# Patient Record
Sex: FEMALE | Race: WHITE | NOT HISPANIC OR LATINO | Employment: FULL TIME | URBAN - NONMETROPOLITAN AREA
[De-identification: names, ages, dates, MRNs, and addresses within clinical notes are randomized per-mention and may not be internally consistent; named-entity substitution may affect disease eponyms.]

---

## 2023-03-02 ENCOUNTER — OFFICE VISIT (OUTPATIENT)
Dept: FAMILY MEDICINE | Facility: CLINIC | Age: 63
End: 2023-03-02
Payer: COMMERCIAL

## 2023-03-02 VITALS
OXYGEN SATURATION: 96 % | RESPIRATION RATE: 18 BRPM | DIASTOLIC BLOOD PRESSURE: 72 MMHG | TEMPERATURE: 98 F | HEIGHT: 67 IN | SYSTOLIC BLOOD PRESSURE: 136 MMHG | WEIGHT: 212 LBS | HEART RATE: 97 BPM | BODY MASS INDEX: 33.27 KG/M2

## 2023-03-02 DIAGNOSIS — I10 PRIMARY HYPERTENSION: ICD-10-CM

## 2023-03-02 DIAGNOSIS — K21.9 GASTROESOPHAGEAL REFLUX DISEASE WITHOUT ESOPHAGITIS: ICD-10-CM

## 2023-03-02 DIAGNOSIS — H93.8X3 SENSATION OF PRESSURE IN EAR, BILATERAL: Primary | ICD-10-CM

## 2023-03-02 DIAGNOSIS — H61.21 IMPACTED CERUMEN OF RIGHT EAR: ICD-10-CM

## 2023-03-02 PROCEDURE — 69209 REMOVE IMPACTED EAR WAX UNI: CPT | Mod: RT,,, | Performed by: NURSE PRACTITIONER

## 2023-03-02 PROCEDURE — 69209 PR REMOVAL IMPACTED CERUMEN USING IRRIGATION/LAVAGE, UNILATERAL: ICD-10-PCS | Mod: RT,,, | Performed by: NURSE PRACTITIONER

## 2023-03-02 PROCEDURE — 99202 PR OFFICE/OUTPT VISIT, NEW, LEVL II, 15-29 MIN: ICD-10-PCS | Mod: 25,,, | Performed by: NURSE PRACTITIONER

## 2023-03-02 PROCEDURE — 99202 OFFICE O/P NEW SF 15 MIN: CPT | Mod: 25,,, | Performed by: NURSE PRACTITIONER

## 2023-03-02 RX ORDER — NAPROXEN SODIUM 220 MG/1
1 TABLET, FILM COATED ORAL DAILY
COMMUNITY

## 2023-03-02 RX ORDER — OMEPRAZOLE 20 MG/1
20 CAPSULE, DELAYED RELEASE ORAL DAILY
COMMUNITY
Start: 2022-10-31

## 2023-03-02 RX ORDER — LISINOPRIL AND HYDROCHLOROTHIAZIDE 12.5; 2 MG/1; MG/1
0.5 TABLET ORAL DAILY
COMMUNITY
Start: 2023-01-23

## 2023-03-02 NOTE — PROGRESS NOTES
ARACELIS Dumont        PATIENT NAME: Vickie Vogt  : 1960  DATE: 3/2/23  MRN: [id number]      Billing Provider: ARACELIS Dumont  Level of Service: AK OFFICE/OUTPT VISIT, OLINDA SANTILLAN II, 15-29 MIN  Patient PCP Information       Provider PCP Type    ARACELIS Dumont [facility]            Reason for Visit / Chief Complaint: Otalgia (Normally sees Dr Ying. Reports she is needing ears washed out, feels pressure in her ears.)         History of Present Illness / Problem Focused Workflow     Vickie Vogt presents to the clinic with Otalgia (Normally sees Dr Ying. Reports she is needing ears washed out, feels pressure in her ears.)     Ms. Vogt presents to clinic with complaints of ear pressure, right greater than left.     Otalgia   There is pain in the right ear. This is a recurrent problem. The current episode started in the past 7 days. The problem has been unchanged. There has been no fever. The pain is at a severity of 0/10. The patient is experiencing no pain. Pertinent negatives include no ear discharge, hearing loss or sore throat. The treatment provided no relief. There is no history of a chronic ear infection, hearing loss or a tympanostomy tube.     Review of Systems     Review of Systems   Constitutional:  Negative for chills and fever.   HENT:  Positive for ear pain. Negative for nasal congestion, ear discharge, hearing loss, postnasal drip, sinus pressure/congestion and sore throat.    Respiratory: Negative.     Cardiovascular: Negative.    Gastrointestinal: Negative.    Genitourinary: Negative.    Neurological: Negative.       Medical / Social / Family History     Past Medical History:   Diagnosis Date    GERD (gastroesophageal reflux disease)     Hypertension        Past Surgical History:   Procedure Laterality Date    HYSTERECTOMY         Social History  Ms. Vickie Vogt   reports that she has been smoking cigarettes. She has been smoking an average of 1.5 packs per day. She has been  exposed to tobacco smoke. She has never used smokeless tobacco. She reports current alcohol use. She reports that she does not use drugs.    Family History  Ms.'s Vickie Vogt  Family history is unknown by patient.    Medications and Allergies     Medications  Outpatient Medications Marked as Taking for the 3/2/23 encounter (Office Visit) with ARACELIS Dumont   Medication Sig Dispense Refill    aspirin 81 MG Chew Take 1 tablet by mouth once daily.      lisinopriL-hydrochlorothiazide (PRINZIDE,ZESTORETIC) 20-12.5 mg per tablet Take 0.5 tablets by mouth once daily.      omeprazole (PRILOSEC) 20 MG capsule Take 20 mg by mouth once daily.         Allergies  Review of patient's allergies indicates:  No Known Allergies      Vitals:    03/02/23 1451   BP: 136/72   Pulse: 97   Resp: 18   Temp: 98.3 °F (36.8 °C)     Physical Exam  Vitals and nursing note reviewed.   Constitutional:       Appearance: Normal appearance.   HENT:      Head: Normocephalic.      Right Ear: Tympanic membrane, ear canal and external ear normal.      Left Ear: Tympanic membrane, ear canal and external ear normal.   Cardiovascular:      Rate and Rhythm: Normal rate and regular rhythm.      Pulses: Normal pulses.      Heart sounds: Normal heart sounds.   Pulmonary:      Effort: Pulmonary effort is normal.      Breath sounds: Normal breath sounds.   Abdominal:      Palpations: Abdomen is soft.   Skin:     General: Skin is warm and dry.   Neurological:      Mental Status: She is alert and oriented to person, place, and time.   Psychiatric:         Behavior: Behavior normal.          No results found for: WBC, HGB, HCT, MCV, PLT       No results found for: NA, K, CL, CO2, GLU, BUN, CREATININE, CALCIUM, PROT, ALBUMIN, BILITOT, ALKPHOS, AST, ALT, ANIONGAP, EGFRNORACEVR     No results found for: CHOL  No results found for: HDL  No results found for: LDLCALC  No results found for: TRIG  No results found for: CHOLHDL     No results found for: LABA1C,  HGBA1C     No results found for: TSH, F3ZEVIY, E3TQSYE, THYROIDAB, FREET4     No results found for: PSA, PSATOTAL, PSAFREE, PSAFREEPCT       Health Maintenance Due   Topic Date Due    Pneumococcal Vaccines (Age 0-64) (1 - PCV) Never done    Hemoglobin A1c (Diabetic Prevention Screening)  Never done       Problem List Items Addressed This Visit          Cardiac/Vascular    Primary hypertension       GI    Gastroesophageal reflux disease without esophagitis     Other Visit Diagnoses       Sensation of pressure in ear, bilateral    -  Primary          Right ear flushed today due to cerumen, instructed pt. To use flonase daily.      Health Maintenance Topics with due status: Not Due       Topic Last Completion Date    Lipid Panel 11/08/2019       No future appointments.     There are no Patient Instructions on file for this visit.  No follow-ups on file.       Date of encounter: 3/2/23

## 2024-01-05 ENCOUNTER — TELEPHONE (OUTPATIENT)
Dept: FAMILY MEDICINE | Facility: CLINIC | Age: 64
End: 2024-01-05

## 2024-01-05 NOTE — TELEPHONE ENCOUNTER
----- Message from Fior Gutierrez sent at 1/5/2024  9:44 AM CST -----  Regarding: Nurse Call Back  Pt needs: a nurse call back.        Phone #: [phone]

## 2024-01-05 NOTE — TELEPHONE ENCOUNTER
Pt called stating she had been having a lingering cough, and thought that she possibly hurt a rib while doing so, after speaking with our NP Wendi , we explained we could not give medical advice with out seeing the Pt and asked her if she would like to come in in which she declined so YANN Adame suggested ibuprofen was an over the counter medication that she recommended for pain and inflammation

## 2025-02-13 ENCOUNTER — OFFICE VISIT (OUTPATIENT)
Dept: FAMILY MEDICINE | Facility: CLINIC | Age: 65
End: 2025-02-13
Payer: COMMERCIAL

## 2025-02-13 VITALS
TEMPERATURE: 98 F | SYSTOLIC BLOOD PRESSURE: 137 MMHG | HEART RATE: 87 BPM | OXYGEN SATURATION: 96 % | WEIGHT: 238 LBS | BODY MASS INDEX: 37.35 KG/M2 | DIASTOLIC BLOOD PRESSURE: 80 MMHG | RESPIRATION RATE: 14 BRPM | HEIGHT: 67 IN

## 2025-02-13 DIAGNOSIS — Z87.09 HISTORY OF PLEURAL EFFUSION: ICD-10-CM

## 2025-02-13 DIAGNOSIS — Z91.09 ENVIRONMENTAL ALLERGIES: ICD-10-CM

## 2025-02-13 DIAGNOSIS — R60.0 BILATERAL LOWER EXTREMITY EDEMA: ICD-10-CM

## 2025-02-13 DIAGNOSIS — I10 PRIMARY HYPERTENSION: Primary | ICD-10-CM

## 2025-02-13 DIAGNOSIS — K21.9 GASTROESOPHAGEAL REFLUX DISEASE, UNSPECIFIED WHETHER ESOPHAGITIS PRESENT: ICD-10-CM

## 2025-02-13 DIAGNOSIS — Z98.890 HISTORY OF THORACENTESIS: ICD-10-CM

## 2025-02-13 DIAGNOSIS — E87.6 HYPOKALEMIA: ICD-10-CM

## 2025-02-13 PROCEDURE — 99214 OFFICE O/P EST MOD 30 MIN: CPT | Mod: ,,, | Performed by: NURSE PRACTITIONER

## 2025-02-13 RX ORDER — FUROSEMIDE 20 MG/1
20 TABLET ORAL DAILY
Qty: 90 TABLET | Refills: 1 | Status: SHIPPED | OUTPATIENT
Start: 2025-02-13

## 2025-02-13 RX ORDER — MONTELUKAST SODIUM 10 MG/1
10 TABLET ORAL
COMMUNITY
Start: 2025-02-05 | End: 2025-02-13 | Stop reason: SDUPTHER

## 2025-02-13 RX ORDER — CARVEDILOL 6.25 MG/1
6.25 TABLET ORAL 2 TIMES DAILY
COMMUNITY
End: 2025-02-13 | Stop reason: SDUPTHER

## 2025-02-13 RX ORDER — OMEPRAZOLE 20 MG/1
20 CAPSULE, DELAYED RELEASE ORAL DAILY
Qty: 90 CAPSULE | Refills: 1 | Status: SHIPPED | OUTPATIENT
Start: 2025-02-13

## 2025-02-13 RX ORDER — MULTIVITAMIN
1 TABLET ORAL
COMMUNITY

## 2025-02-13 RX ORDER — LISINOPRIL 10 MG/1
10 TABLET ORAL
COMMUNITY
Start: 2025-01-14 | End: 2025-02-13 | Stop reason: SDUPTHER

## 2025-02-13 RX ORDER — CARVEDILOL 6.25 MG/1
6.25 TABLET ORAL 2 TIMES DAILY
Qty: 180 TABLET | Refills: 1 | Status: SHIPPED | OUTPATIENT
Start: 2025-02-13

## 2025-02-13 RX ORDER — FERROUS SULFATE 325(65) MG
325 TABLET, DELAYED RELEASE (ENTERIC COATED) ORAL
COMMUNITY

## 2025-02-13 RX ORDER — MONTELUKAST SODIUM 10 MG/1
10 TABLET ORAL DAILY
Qty: 90 TABLET | Refills: 0 | Status: SHIPPED | OUTPATIENT
Start: 2025-02-13

## 2025-02-13 RX ORDER — FUROSEMIDE 20 MG/1
20 TABLET ORAL DAILY
COMMUNITY
End: 2025-02-13 | Stop reason: SDUPTHER

## 2025-02-13 RX ORDER — LISINOPRIL 10 MG/1
10 TABLET ORAL DAILY
Qty: 90 TABLET | Refills: 1 | Status: SHIPPED | OUTPATIENT
Start: 2025-02-13

## 2025-02-13 RX ORDER — POTASSIUM CHLORIDE 600 MG/1
8 TABLET, FILM COATED, EXTENDED RELEASE ORAL
COMMUNITY
Start: 2025-01-02 | End: 2025-02-13 | Stop reason: SDUPTHER

## 2025-02-13 RX ORDER — POTASSIUM CHLORIDE 600 MG/1
8 TABLET, FILM COATED, EXTENDED RELEASE ORAL DAILY
Qty: 90 TABLET | Refills: 0 | Status: SHIPPED | OUTPATIENT
Start: 2025-02-13

## 2025-02-13 NOTE — PROGRESS NOTES
[facility] Medicine    Chief Complaint      Chief Complaint   Patient presents with    Establish Care    insurance     Pt has insurance but currently not accepted. Pt requesting care gaps be done when she does have insurance we take       History of Present Illness      Vickie Vogt is a 64 y.o. female. She  has a past medical history of Bilateral edema of lower extremity, GERD (gastroesophageal reflux disease), Hypertension, Pleural effusion, and Prediabetes., who presents today for Establishing Care.    Past Medical History:  Past Medical History:   Diagnosis Date    Bilateral edema of lower extremity     GERD (gastroesophageal reflux disease)     Hypertension     Pleural effusion     right sided pleural effusion with atelectasis    Prediabetes        Past Surgical History:   has a past surgical history that includes Hysterectomy and Drainage of pleural effusion (Right).    Social History:  Social History     Tobacco Use    Smoking status: Former     Current packs/day: 0.00     Average packs/day: 1.5 packs/day for 49.7 years (74.6 ttl pk-yrs)     Types: Cigarettes     Start date: 1975     Quit date: 2024     Years since quittin.2     Passive exposure: Current    Smokeless tobacco: Never   Substance Use Topics    Alcohol use: Yes     Comment: 3-4 beers a day    Drug use: Never       I personally reviewed all past medical, surgical, and social.     Review of Systems   Constitutional:  Negative for fatigue.   HENT:  Negative for ear pain and sore throat.    Eyes:  Negative for pain, discharge and visual disturbance.   Respiratory:  Negative for cough and shortness of breath.    Cardiovascular: Negative.    Gastrointestinal:  Negative for abdominal pain, constipation and diarrhea.   Genitourinary:  Negative for dysuria, menstrual problem and vaginal discharge.   Musculoskeletal:  Negative for gait problem.   Skin: Negative.    Neurological:  Negative for dizziness and light-headedness.         Medications:  Outpatient Encounter Medications as of 2/13/2025   Medication Sig Dispense Refill    aspirin 81 MG Chew Take 1 tablet by mouth once daily.      ferrous sulfate 325 (65 FE) MG EC tablet Take 325 mg by mouth.      multivitamin (THERAGRAN) per tablet Take 1 tablet by mouth.      [DISCONTINUED] carvediloL (COREG) 6.25 MG tablet Take 6.25 mg by mouth 2 (two) times daily.      [DISCONTINUED] furosemide (LASIX) 20 MG tablet Take 20 mg by mouth once daily.      [DISCONTINUED] lisinopriL 10 MG tablet Take 10 mg by mouth.      [DISCONTINUED] montelukast (SINGULAIR) 10 mg tablet Take 10 mg by mouth.      [DISCONTINUED] omeprazole (PRILOSEC) 20 MG capsule Take 20 mg by mouth once daily.      [DISCONTINUED] potassium chloride (KLOR-CON) 8 MEQ TbSR Take 8 mEq by mouth.      carvediloL (COREG) 6.25 MG tablet Take 1 tablet (6.25 mg total) by mouth 2 (two) times daily. 180 tablet 1    furosemide (LASIX) 20 MG tablet Take 1 tablet (20 mg total) by mouth once daily. 90 tablet 1    lisinopriL 10 MG tablet Take 1 tablet (10 mg total) by mouth once daily. 90 tablet 1    montelukast (SINGULAIR) 10 mg tablet Take 1 tablet (10 mg total) by mouth once daily. 90 tablet 0    omeprazole (PRILOSEC) 20 MG capsule Take 1 capsule (20 mg total) by mouth once daily. 90 capsule 1    potassium chloride (KLOR-CON) 8 MEQ TbSR Take 1 tablet (8 mEq total) by mouth once daily. 90 tablet 0    [DISCONTINUED] lisinopriL-hydrochlorothiazide (PRINZIDE,ZESTORETIC) 20-12.5 mg per tablet Take 0.5 tablets by mouth once daily.       No facility-administered encounter medications on file as of 2/13/2025.       Allergies:  Review of patient's allergies indicates:  No Known Allergies    Health Maintenance:    There is no immunization history on file for this patient.   Health Maintenance   Topic Date Due    Hepatitis C Screening  Never done    Lipid Panel  Never done    TETANUS VACCINE  Never done    Mammogram  Never done    Colorectal Cancer Screening  " Never done    LDCT Lung Screen  Never done    Shingles Vaccine (1 of 2) Never done    Pneumococcal Vaccines (Age 50+) (1 of 1 - PCV) Never done    RSV Vaccine (Age 60+ and Pregnant patients) (1 - Risk 60-74 years 1-dose series) Never done    Influenza Vaccine (1) 09/01/2024    COVID-19 Vaccine (1 - 2024-25 season) Never done    HIV Screening  03/02/2029 (Originally 6/14/1975)    Hemoglobin A1c (Diabetic Prevention Screening)  01/14/2028        Physical Exam      Vital Signs  Temp: 97.8 °F (36.6 °C)  Temp Source: Oral  Pulse: 87  Resp: 14  SpO2: 96 %  BP: 137/80  Pain Score: 0-No pain  Height and Weight  Height: 5' 7" (170.2 cm)  Weight: 108 kg (238 lb)  BSA (Calculated - sq m): 2.26 sq meters  BMI (Calculated): 37.3  Weight in (lb) to have BMI = 25: 159.3]    Physical Exam  Vitals and nursing note reviewed.   Constitutional:       Appearance: Normal appearance. She is well-developed.   HENT:      Head: Normocephalic.      Right Ear: Hearing normal.      Left Ear: Hearing normal.      Nose: Nose normal.   Eyes:      General: Lids are normal.      Extraocular Movements: Extraocular movements intact.      Conjunctiva/sclera: Conjunctivae normal.      Pupils: Pupils are equal, round, and reactive to light.   Cardiovascular:      Rate and Rhythm: Normal rate and regular rhythm.      Heart sounds: Normal heart sounds.   Pulmonary:      Effort: Pulmonary effort is normal.      Breath sounds: Normal breath sounds.   Musculoskeletal:         General: Normal range of motion.      Cervical back: Normal range of motion and neck supple.      Right lower leg: No edema.      Left lower leg: No edema.   Skin:     General: Skin is warm and dry.   Neurological:      Mental Status: She is alert and oriented to person, place, and time.      Gait: Gait is intact.   Psychiatric:         Behavior: Behavior is cooperative.          Laboratory:  CBC:      CMP:        Invalid input(s): "CREATININ"  LIPIDS:      TSH:      A1C:  Recent Labs "   Lab 01/14/25  1600   Hemoglobin A1C 6       Assessment/Plan     Vickie Vogt is a 64 y.o.female with:     1. Primary hypertension  -     carvediloL (COREG) 6.25 MG tablet; Take 1 tablet (6.25 mg total) by mouth 2 (two) times daily.  Dispense: 180 tablet; Refill: 1  -     furosemide (LASIX) 20 MG tablet; Take 1 tablet (20 mg total) by mouth once daily.  Dispense: 90 tablet; Refill: 1  -     lisinopriL 10 MG tablet; Take 1 tablet (10 mg total) by mouth once daily.  Dispense: 90 tablet; Refill: 1    2. Environmental allergies  -     montelukast (SINGULAIR) 10 mg tablet; Take 1 tablet (10 mg total) by mouth once daily.  Dispense: 90 tablet; Refill: 0    3. Gastroesophageal reflux disease, unspecified whether esophagitis present  -     omeprazole (PRILOSEC) 20 MG capsule; Take 1 capsule (20 mg total) by mouth once daily.  Dispense: 90 capsule; Refill: 1    4. Hypokalemia  -     potassium chloride (KLOR-CON) 8 MEQ TbSR; Take 1 tablet (8 mEq total) by mouth once daily.  Dispense: 90 tablet; Refill: 0    5. History of pleural effusion    6. History of thoracentesis    7. Bilateral lower extremity edema         Total time spent face-to-face and non-face-to-face coordinating care for this encounter was: 30 minutes     Chronic conditions status updated as per HPI.  Other than changes above, cont current medications and maintain follow up with specialists.  Return to clinic in 6 month(s).    Wendi Padron, ARACELIS  [facility]

## 2025-06-10 ENCOUNTER — APPOINTMENT (OUTPATIENT)
Dept: RADIOLOGY | Facility: CLINIC | Age: 65
End: 2025-06-10
Attending: NURSE PRACTITIONER
Payer: MEDICARE

## 2025-06-10 ENCOUNTER — OFFICE VISIT (OUTPATIENT)
Dept: FAMILY MEDICINE | Facility: CLINIC | Age: 65
End: 2025-06-10
Payer: MEDICARE

## 2025-06-10 VITALS
HEART RATE: 83 BPM | WEIGHT: 235 LBS | RESPIRATION RATE: 15 BRPM | TEMPERATURE: 98 F | BODY MASS INDEX: 36.88 KG/M2 | SYSTOLIC BLOOD PRESSURE: 134 MMHG | HEIGHT: 67 IN | OXYGEN SATURATION: 96 % | DIASTOLIC BLOOD PRESSURE: 70 MMHG

## 2025-06-10 DIAGNOSIS — Z87.09 HISTORY OF PLEURAL EFFUSION: ICD-10-CM

## 2025-06-10 DIAGNOSIS — Z09 FOLLOW-UP EXAM: Primary | ICD-10-CM

## 2025-06-10 PROCEDURE — 71046 X-RAY EXAM CHEST 2 VIEWS: CPT | Mod: 26,,, | Performed by: RADIOLOGY

## 2025-06-10 PROCEDURE — 71046 X-RAY EXAM CHEST 2 VIEWS: CPT | Mod: TC,RHCUB | Performed by: NURSE PRACTITIONER

## 2025-06-10 PROCEDURE — 99212 OFFICE O/P EST SF 10 MIN: CPT | Mod: ,,, | Performed by: NURSE PRACTITIONER

## 2025-06-10 NOTE — PROGRESS NOTES
[facility] Medicine    Chief Complaint      Chief Complaint   Patient presents with    Follow-up     On Dyspnea , Patient reports she is here to have xrays to make sure fluid is not building up on her lungs since  she had it removed in December        History of Present Illness      Vickie Vogt is a 64 y.o. female. She  has a past medical history of Bilateral edema of lower extremity, GERD (gastroesophageal reflux disease), Hypertension, Pleural effusion, and Prediabetes., who presents today for f/u of her SOB with pleural effusion.  She would like a f/u chest xray just to make sure everything is still ok. She denies any symptoms today.     Past Medical History:  Past Medical History:   Diagnosis Date    Bilateral edema of lower extremity     GERD (gastroesophageal reflux disease)     Hypertension     Pleural effusion     right sided pleural effusion with atelectasis    Prediabetes        Past Surgical History:   has a past surgical history that includes Hysterectomy and Drainage of pleural effusion (Right).    Social History:  Social History[1]    I personally reviewed all past medical, surgical, and social.     Review of Systems   Constitutional:  Negative for fatigue.   Respiratory:  Negative for cough and shortness of breath.    Cardiovascular: Negative.    Musculoskeletal:  Negative for gait problem.   Skin: Negative.    Neurological:  Negative for dizziness, light-headedness and headaches.        Medications:  Encounter Medications[2]    Allergies:  Review of patient's allergies indicates:  No Known Allergies    Health Maintenance:  Immunization History   Administered Date(s) Administered    COVID-19, MRNA, LN-S, PF (MODERNA FULL 0.5 ML DOSE) 08/20/2021, 09/17/2021      Health Maintenance   Topic Date Due    Hepatitis C Screening  Never done    TETANUS VACCINE  Never done    Mammogram  Never done    Colorectal Cancer Screening  Never done    Shingles Vaccine (1 of 2) Never done    Pneumococcal Vaccines (Age  "50+) (1 of 1 - PCV) Never done    RSV Vaccine (Age 60+ and Pregnant patients) (1 - Risk 60-74 years 1-dose series) Never done    COVID-19 Vaccine (3 - 2024-25 season) 09/01/2024    HIV Screening  03/02/2029 (Originally 6/14/1975)    Influenza Vaccine (Season Ended) 09/01/2025    LDCT Lung Screen  11/03/2025    Hemoglobin A1c (Diabetic Prevention Screening)  01/14/2028    Lipid Panel  11/04/2029        Physical Exam      Vital Signs  Temp: 98.3 °F (36.8 °C)  Temp Source: Oral  Pulse: 83  Resp: 15  SpO2: 96 %  BP: 134/70  BP Location: Left arm  Patient Position: Sitting  Pain Score: 0-No pain  Height and Weight  Height: 5' 7" (170.2 cm)  Weight: 106.6 kg (235 lb)  BSA (Calculated - sq m): 2.24 sq meters  BMI (Calculated): 36.8  Weight in (lb) to have BMI = 25: 159.3]    Physical Exam  Vitals and nursing note reviewed.   Constitutional:       Appearance: Normal appearance. She is well-developed.   HENT:      Head: Normocephalic.      Right Ear: Hearing normal.      Left Ear: Hearing normal.      Nose: Nose normal.   Eyes:      General: Lids are normal.      Conjunctiva/sclera: Conjunctivae normal.   Cardiovascular:      Rate and Rhythm: Normal rate and regular rhythm.      Heart sounds: Normal heart sounds.   Pulmonary:      Effort: Pulmonary effort is normal.      Breath sounds: Normal breath sounds.   Musculoskeletal:         General: Normal range of motion.      Cervical back: Normal range of motion and neck supple.      Right lower leg: No edema.      Left lower leg: No edema.   Skin:     General: Skin is warm and dry.   Neurological:      Mental Status: She is alert and oriented to person, place, and time.      Gait: Gait is intact.   Psychiatric:         Behavior: Behavior is cooperative.          Laboratory:  CBC:      CMP:        Invalid input(s): "CREATININ"  LIPIDS:      TSH:      A1C:  Recent Labs   Lab 01/14/25  1600   Hemoglobin A1C 6       Assessment/Plan     Vickie Vogt is a 64 y.o.female with:     1. " Follow-up exam  -     X-Ray Chest PA And Lateral; Future; Expected date: 06/10/2025    2. History of pleural effusion  -     X-Ray Chest PA And Lateral; Future; Expected date: 06/10/2025         Total time spent face-to-face and non-face-to-face coordinating care for this encounter was: 15 minutes     Chronic conditions status updated as per HPI.  Other than changes above, cont current medications and maintain follow up with specialists.  Return to clinic prn if symptoms worsen or fail to improve.    Wendi Padron, [facility]         [1]   Social History  Tobacco Use    Smoking status: Former     Current packs/day: 0.00     Average packs/day: 1.5 packs/day for 49.7 years (74.6 ttl pk-yrs)     Types: Cigarettes     Start date: 1975     Quit date: 2024     Years since quittin.5     Passive exposure: Current    Smokeless tobacco: Never   Substance Use Topics    Alcohol use: Yes     Comment: 3-4 beers a day    Drug use: Never   [2]   Outpatient Encounter Medications as of 6/10/2025   Medication Sig Dispense Refill    aspirin 81 MG Chew Take 1 tablet by mouth once daily.      carvediloL (COREG) 6.25 MG tablet Take 1 tablet (6.25 mg total) by mouth 2 (two) times daily. 180 tablet 1    ferrous sulfate 325 (65 FE) MG EC tablet Take 325 mg by mouth.      furosemide (LASIX) 20 MG tablet Take 1 tablet (20 mg total) by mouth once daily. 90 tablet 1    lisinopriL 10 MG tablet Take 1 tablet (10 mg total) by mouth once daily. 90 tablet 1    montelukast (SINGULAIR) 10 mg tablet Take 1 tablet (10 mg total) by mouth once daily. 90 tablet 0    multivitamin (THERAGRAN) per tablet Take 1 tablet by mouth.      omeprazole (PRILOSEC) 20 MG capsule Take 1 capsule (20 mg total) by mouth once daily. 90 capsule 1    potassium chloride (KLOR-CON) 8 MEQ TbSR Take 1 tablet (8 mEq total) by mouth once daily. 90 tablet 0     No facility-administered encounter medications on file as of 6/10/2025.

## 2025-06-13 ENCOUNTER — TELEPHONE (OUTPATIENT)
Dept: FAMILY MEDICINE | Facility: CLINIC | Age: 65
End: 2025-06-13
Payer: COMMERCIAL

## 2025-06-13 NOTE — TELEPHONE ENCOUNTER
----- Message from Mireya sent at 6/13/2025  9:55 AM CDT -----  Regarding: Xray Results  Pt called stating she had an xray done on 06/10/2025 and was told that she would get a call about the results the next day, but she has not heard anything yet. Pt phone : [phone]

## 2025-08-07 DIAGNOSIS — Z91.09 ENVIRONMENTAL ALLERGIES: ICD-10-CM

## 2025-08-07 RX ORDER — MONTELUKAST SODIUM 10 MG/1
10 TABLET ORAL DAILY
Qty: 90 TABLET | Refills: 0 | Status: SHIPPED | OUTPATIENT
Start: 2025-08-07

## 2025-08-07 NOTE — TELEPHONE ENCOUNTER
----- Message from Fior sent at 8/7/2025 10:15 AM CDT -----  Regarding: Med  Contact: Patient  Pt needs: montelukast (SINGULAIR) 10 mg tablet    Pharmacy: [facility]    Phone #: [phone] (X)

## 2025-08-28 DIAGNOSIS — E87.6 HYPOKALEMIA: ICD-10-CM

## 2025-08-28 RX ORDER — POTASSIUM CHLORIDE 600 MG/1
8 TABLET, EXTENDED RELEASE ORAL DAILY
Qty: 90 TABLET | Refills: 0 | Status: SHIPPED | OUTPATIENT
Start: 2025-08-28

## 2025-08-28 RX ORDER — POTASSIUM CHLORIDE 600 MG/1
8 TABLET, EXTENDED RELEASE ORAL DAILY
Qty: 90 TABLET | Refills: 0 | OUTPATIENT
Start: 2025-08-28